# Patient Record
Sex: FEMALE | Race: WHITE | NOT HISPANIC OR LATINO | Employment: OTHER | URBAN - METROPOLITAN AREA
[De-identification: names, ages, dates, MRNs, and addresses within clinical notes are randomized per-mention and may not be internally consistent; named-entity substitution may affect disease eponyms.]

---

## 2020-01-30 ENCOUNTER — OFFICE VISIT (OUTPATIENT)
Dept: URGENT CARE | Facility: CLINIC | Age: 70
End: 2020-01-30
Payer: COMMERCIAL

## 2020-01-30 VITALS
RESPIRATION RATE: 16 BRPM | WEIGHT: 180.4 LBS | OXYGEN SATURATION: 97 % | TEMPERATURE: 99.2 F | DIASTOLIC BLOOD PRESSURE: 84 MMHG | HEART RATE: 91 BPM | BODY MASS INDEX: 35.42 KG/M2 | HEIGHT: 60 IN | SYSTOLIC BLOOD PRESSURE: 126 MMHG

## 2020-01-30 DIAGNOSIS — B96.89 ACUTE BACTERIAL BRONCHITIS: Primary | ICD-10-CM

## 2020-01-30 DIAGNOSIS — H10.10 ALLERGIC RHINOCONJUNCTIVITIS: ICD-10-CM

## 2020-01-30 DIAGNOSIS — J20.8 ACUTE BACTERIAL BRONCHITIS: Primary | ICD-10-CM

## 2020-01-30 DIAGNOSIS — J30.9 ALLERGIC RHINOCONJUNCTIVITIS: ICD-10-CM

## 2020-01-30 PROCEDURE — 99202 OFFICE O/P NEW SF 15 MIN: CPT | Performed by: PHYSICIAN ASSISTANT

## 2020-01-30 RX ORDER — BUPROPION HYDROCHLORIDE 150 MG/1
150 TABLET ORAL DAILY
COMMUNITY

## 2020-01-30 RX ORDER — ALBUTEROL SULFATE 90 UG/1
2 AEROSOL, METERED RESPIRATORY (INHALATION) EVERY 4 HOURS PRN
Qty: 8.5 G | Refills: 0 | Status: SHIPPED | OUTPATIENT
Start: 2020-01-30

## 2020-01-30 RX ORDER — AMLODIPINE BESYLATE 10 MG/1
10 TABLET ORAL DAILY
COMMUNITY

## 2020-01-30 RX ORDER — AZITHROMYCIN 250 MG/1
TABLET, FILM COATED ORAL
Qty: 6 TABLET | Refills: 0 | Status: SHIPPED | OUTPATIENT
Start: 2020-01-30 | End: 2020-02-03

## 2020-01-30 RX ORDER — LORAZEPAM 0.5 MG/1
TABLET ORAL EVERY 8 HOURS PRN
COMMUNITY

## 2020-01-30 RX ORDER — SIMVASTATIN 40 MG
40 TABLET ORAL
COMMUNITY

## 2020-01-30 RX ORDER — METHYLPREDNISOLONE 4 MG/1
TABLET ORAL
Qty: 1 EACH | Refills: 0 | Status: SHIPPED | OUTPATIENT
Start: 2020-01-30

## 2020-01-30 NOTE — PATIENT INSTRUCTIONS
Take the antibiotic as directed with food and water  Take a probiotic while taking this medication  Take the steroid as directed with food and water  While on this medication do not take any NSAIDs including ibuprofen (Advil), naproxen (Aleve), etc  Avoid caffeinated beverages while taking this medication  Use the inhaler as directed  Ensure that you prime the inhaler prior to first use and rinse your mouth after each use  Saltwater gargles, warm tea with honey, throat lozenges, and steam showers may help to reduce coughing fits  Use a cool mist humidifier at bedtime, turning on hours prior to bed with your bedroom doors shut for maximum relief  Follow up with your family doctor in 3-5 days if symptoms persist   Proceed to the ER if symptoms worsen  How to Use a Metered-Dose Inhaler   WHAT YOU NEED TO KNOW:   A metered-dose inhaler is a handheld device that gives you a dose of medicine as a mist  You breathe the medicine deep into your lungs to open your airways  The medicine either gives quick relief or long term control of symptoms  Bronchodilators open your airways  Mucolytics thin secretions and make them easier to cough up  Steroids decrease inflammation in your airways  DISCHARGE INSTRUCTIONS:   Return to the emergency department if:   · Your lips or nails turn blue or gray  · The skin between your ribs or around your neck pulls in with every breath  · You feel short of breath, even after you use your inhaler  Contact your healthcare provider if:   · You feel the medicine spray on your tongue or throat, rather than going into your lungs  · You have to take more puffs from the inhaler than directed, in order to get relief  · You run out of medicine before your next refill is due  · You feel like your medicine is not making your symptoms better  · You have questions or concerns about your condition or care  How to use a metered-dose inhaler:  Practice using your inhaler   Your medicine will work best if you use them correctly  The following steps will help you use your inhaler correctly:  · Prepare your inhaler:     ¨ Remove the cap  Check to make sure there is nothing in the mouthpiece that could block the medicine from coming out  ¨ Shake the inhaler to mix the medicine  ¨ Hold the inhaler upright, with the mouthpiece pointing towards your mouth  · Get ready to breathe in the medicine:      ¨ Keep your mouth away from the inhaler mouthpiece, and breathe out fully to clear your lungs  ¨ Place the mouthpiece between your lips  Close your lips around the mouthpiece to form a seal and prevent a medicine leak  · Start to breathe in slowly through your mouth  as  you press down the canister  This helps the medicine get into your lungs  · Hold your breath for at least 5 seconds  This will help the medicine reach all parts of your lungs, including the smaller parts called the alveoli  · Breathe out slowly through pursed lips  This helps keep your airway open and allows the medicine to be absorbed into more areas  · Repeat puffs of medicine as directed by your healthcare provider  Wait about 2 minutes between puffs  If you need to use a bronchodilator and a steroid inhaler, use the bronchodilator first  Wait 5 minutes then use the steroid inhaler  · Gargle with warm water to remove any leftover medicine from your mouth and throat  Care for your inhaler properly:  Put the cap back on the inhaler after each use to keep the mouthpiece clean  Clean the inhaler at least once a week  Remove the canister and wash the fernández with warm soapy water  Rinse and allow to air dry  Make sure the fernández is completely dry before putting the canister back in  Follow up with your healthcare provider or specialist as directed:  Bring your inhaler to all of your visits   You may be asked to use your inhaler at these visits so your healthcare provider can make sure you are using it correctly  Write down your questions, so you remember to ask them during your visits  © 2017 [employer] Information is for End User's use only and may not be sold, redistributed or otherwise used for commercial purposes  All illustrations and images included in CareNotes® are the copyrighted property of [employer]  or [employer] Willson  The above information is an  only  It is not intended as medical advice for individual conditions or treatments  Talk to your doctor, nurse or pharmacist before following any medical regimen to see if it is safe and effective for you

## 2020-01-30 NOTE — PROGRESS NOTES
[facility]        NAME: Gerline Severs is a 69 y o  female  : 1950    MRN: [id number]  DATE: 2020  TIME: 2:16 PM    Assessment and Plan   Acute bacterial bronchitis [J20 8, B96 89]  1  Acute bacterial bronchitis  azithromycin (ZITHROMAX) 250 mg tablet    methylPREDNISolone 4 MG tablet therapy pack    albuterol (PROAIR HFA) 90 mcg/act inhaler   2  Allergic rhinoconjunctivitis       Patient Instructions   Take the antibiotic as directed with food and water  Take a probiotic while taking this medication  Take the steroid as directed with food and water  While on this medication do not take any NSAIDs including ibuprofen (Advil), naproxen (Aleve), etc  Avoid caffeinated beverages while taking this medication  Use the inhaler as directed  Ensure that you prime the inhaler prior to first use and rinse your mouth after each use  Saltwater gargles, warm tea with honey, throat lozenges, and steam showers may help to reduce coughing fits  Use a cool mist humidifier at bedtime, turning on hours prior to bed with your bedroom doors shut for maximum relief  Follow up with your family doctor in 3-5 days if symptoms persist   Proceed to the ER if symptoms worsen  The diagnosis, expected course of illness, and treatment plan were reviewed  Advised Zyrtec and allergy relieving eyedrops for allergy symptoms  All questions answered  Precautions given  Patient verbalized understanding and agreement with the treatment plan  Chief Complaint     Chief Complaint   Patient presents with   Gardenia Miller Like Symptoms     has been sick since  getting better then sick the next day  Cough and body aches, tired     History of Present Illness     68 y/o female with c/o cough x 1 month  Notes she and her  have been passing this cough back and forth over this time reporting good days and bad days    States she thought she was on the mend, however over the past few weeks has been feverish, with chills, sweats, and fatigue  Over the past few days cough has become more harsh with intermittent wheezing, and GIBBONS  Notes she will feel tight or chest congestion that reduces but does not fully relieve with coughing  Wheezing is primarily heard at nighttime but is occasionally heard throughout the day  It typically relieves with coughing or on its own with rest and time  Denies chest pain, palpitations, or resting SOB  No abdominal pains, nausea, vomiting, or diarrhea  States she may have had some congestion at onset 1 month ago, however this has resolved  Reports her  is sick with similar symptoms, though more mild  Denies recent travel  Is new to the area and notes that she has had bags under her eyes, and itchy eyes for the past month or 2  Is wondering if she should take an allergy pill for this  Reports a history of allergies years ago but none recently  No recent travel  No sick contacts  No contacts who have recently traveled  Nonsmoker  No passive smoke  No flu shot  Believes she had pneumonia shot  Review of Systems   Review of Systems   Constitutional: Positive for chills, diaphoresis, fatigue and fever  HENT: Negative for congestion, ear pain, rhinorrhea and sore throat  Eyes: Positive for redness and itching  Negative for visual disturbance  Respiratory: Positive for cough, shortness of breath and wheezing  Cardiovascular: Negative for chest pain and palpitations  Gastrointestinal: Negative for abdominal pain, diarrhea, nausea and vomiting  Musculoskeletal: Positive for myalgias  Skin: Negative for rash  Neurological: Negative for dizziness and headaches       Current Medications       Current Outpatient Medications:     amLODIPine (NORVASC) 10 mg tablet, Take 10 mg by mouth daily, Disp: , Rfl:     buPROPion (WELLBUTRIN XL) 150 mg 24 hr tablet, Take 150 mg by mouth daily, Disp: , Rfl:     esomeprazole (NexIUM) 20 mg capsule, Take 20 mg by mouth every morning before breakfast, Disp: , Rfl:     LORazepam (ATIVAN) 0 5 mg tablet, Take by mouth every 8 (eight) hours as needed for anxiety, Disp: , Rfl:     simvastatin (ZOCOR) 40 mg tablet, Take 40 mg by mouth daily at bedtime, Disp: , Rfl:     albuterol (PROAIR HFA) 90 mcg/act inhaler, Inhale 2 puffs every 4 (four) hours as needed for wheezing or shortness of breath, Disp: 8 5 g, Rfl: 0    azithromycin (ZITHROMAX) 250 mg tablet, Take two tablets on day one, then one tablet daily  , Disp: 6 tablet, Rfl: 0    methylPREDNISolone 4 MG tablet therapy pack, Use as directed on package, Disp: 1 each, Rfl: 0    Current Allergies     Allergies as of 01/30/2020    (No Known Allergies)            The following portions of the patient's history were reviewed and updated as appropriate: allergies, current medications, past family history, past medical history, past social history, past surgical history and problem list      Past Medical History:   Diagnosis Date    Depression     GERD (gastroesophageal reflux disease)     Hypercholesterolemia     Hypertension        History reviewed  No pertinent surgical history  History reviewed  No pertinent family history  Medications have been verified  Objective   /84   Pulse 91   Temp 99 2 °F (37 3 °C)   Resp 16   Ht 4' 11 5" (1 511 m)   Wt 81 8 kg (180 lb 6 4 oz)   SpO2 97%   BMI 35 83 kg/m²      Physical Exam     Physical Exam   Constitutional: Vital signs are normal  She appears well-developed and well-nourished  She is cooperative  She appears ill  No distress  HENT:   Head: Normocephalic and atraumatic  Right Ear: Hearing, tympanic membrane, external ear and ear canal normal    Left Ear: Hearing, tympanic membrane, external ear and ear canal normal    Nose: Nose normal    Mouth/Throat: Uvula is midline, oropharynx is clear and moist and mucous membranes are normal  Mucous membranes are not pale, not dry and not cyanotic  No oral lesions   No uvula swelling  No oropharyngeal exudate, posterior oropharyngeal edema, posterior oropharyngeal erythema or tonsillar abscesses  Eyes: Conjunctivae and lids are normal  Right eye exhibits no discharge and no exudate  Left eye exhibits no discharge and no exudate  Right conjunctiva is not injected  Left conjunctiva is not injected  Neck: Trachea normal and phonation normal  Neck supple  No tracheal tenderness present  No neck rigidity  No edema and no erythema present  Cardiovascular: Normal rate, regular rhythm and normal heart sounds  Exam reveals no distant heart sounds  Pulmonary/Chest: Effort normal  No stridor  No respiratory distress  She has decreased breath sounds  She has no wheezes  She has no rhonchi  She has no rales  Lymphadenopathy:     She has no cervical adenopathy  Neurological: She is alert  She is not disoriented  No cranial nerve deficit  Coordination and gait normal    Skin: Skin is warm, dry and intact  No rash noted  She is not diaphoretic  No erythema  No pallor  Psychiatric: She has a normal mood and affect  Her behavior is normal  Judgment and thought content normal    Nursing note and vitals reviewed

## 2020-12-04 DIAGNOSIS — Z20.822 EXPOSURE TO COVID-19 VIRUS: Primary | ICD-10-CM

## 2022-06-11 ENCOUNTER — OFFICE VISIT (OUTPATIENT)
Dept: URGENT CARE | Facility: CLINIC | Age: 72
End: 2022-06-11
Payer: COMMERCIAL

## 2022-06-11 VITALS
HEART RATE: 87 BPM | OXYGEN SATURATION: 98 % | DIASTOLIC BLOOD PRESSURE: 84 MMHG | SYSTOLIC BLOOD PRESSURE: 152 MMHG | HEIGHT: 61 IN | BODY MASS INDEX: 33.23 KG/M2 | RESPIRATION RATE: 20 BRPM | WEIGHT: 176 LBS | TEMPERATURE: 96.9 F

## 2022-06-11 DIAGNOSIS — R42 DIZZINESS AND GIDDINESS: Primary | ICD-10-CM

## 2022-06-11 DIAGNOSIS — H61.23 BILATERAL IMPACTED CERUMEN: ICD-10-CM

## 2022-06-11 PROCEDURE — 99213 OFFICE O/P EST LOW 20 MIN: CPT | Performed by: PHYSICIAN ASSISTANT

## 2022-06-11 PROCEDURE — 69209 REMOVE IMPACTED EAR WAX UNI: CPT | Performed by: PHYSICIAN ASSISTANT

## 2022-06-11 NOTE — PATIENT INSTRUCTIONS
Cerumen removed from both ears with improvement in hearing and sensation of dizziness  Use over-the-counter Debrox ear drops for future prevention of impaction  Recommend continued use of Dramamine as needed for episodes of dizziness  Encouraged follow-up with PCP in next few days  To return or be seen in ER with any progressing or worsening symptoms

## 2022-06-11 NOTE — PROGRESS NOTES
[facility]        NAME: Freda Kim is a 67 y o  female  : 1950    MRN: [id number]  DATE: 2022  TIME: 12:51 PM    Assessment and Plan   Dizziness and giddiness [R42]  1  Dizziness and giddiness     2  Bilateral impacted cerumen           Patient Instructions     Patient Instructions   Cerumen removed from both ears with improvement in hearing and sensation of dizziness  Use over-the-counter Debrox ear drops for future prevention of impaction  Recommend continued use of Dramamine as needed for episodes of dizziness  Encouraged follow-up with PCP in next few days  To return or be seen in ER with any progressing or worsening symptoms  Follow up with PCP in 3-5 days  Proceed to  ER if symptoms worsen  Chief Complaint     Chief Complaint   Patient presents with    Dizziness     Pt presents with dizziness x 3 days with nausea  History of Present Illness       Patient is a 28-year-old female presenting today with dizziness x3 days  Patient notes 3 days ago after attempting to get out of bed she was hit with sudden onset of dizziness, notes that following this she had an episode of emesis and had to lie around at home for most of the day due to her dizziness, describes the episode much like vertigo, states that yesterday morning and this morning she has also experienced slight episodes of dizziness upon awakening, states that she is feeling much better today than she did 3 days ago but still experiences some feeling of uneasiness while walking  Reports taking Dramamine yesterday which did make her feel much better  Notes that her ears have also been bothering her and felt clogged for the last couple weeks  Denies syncope, chest pain, palpitations, lightheadedness, abdominal pain  Review of Systems   Review of Systems   Constitutional: Negative for chills and fever  HENT: Negative for ear pain and sore throat           See HPI   Eyes: Negative for pain and visual disturbance  Respiratory: Negative for cough and shortness of breath  Cardiovascular: Negative for chest pain and palpitations  Gastrointestinal: Positive for nausea and vomiting  Negative for abdominal pain  Genitourinary: Negative for dysuria and hematuria  Musculoskeletal: Negative for arthralgias and back pain  Skin: Negative for color change and rash  Neurological: Positive for dizziness  Negative for seizures and syncope  All other systems reviewed and are negative  Current Medications       Current Outpatient Medications:     albuterol (PROAIR HFA) 90 mcg/act inhaler, Inhale 2 puffs every 4 (four) hours as needed for wheezing or shortness of breath, Disp: 8 5 g, Rfl: 0    amLODIPine (NORVASC) 10 mg tablet, Take 10 mg by mouth daily, Disp: , Rfl:     buPROPion (WELLBUTRIN XL) 150 mg 24 hr tablet, Take 150 mg by mouth daily, Disp: , Rfl:     esomeprazole (NexIUM) 20 mg capsule, Take 20 mg by mouth every morning before breakfast, Disp: , Rfl:     LORazepam (ATIVAN) 0 5 mg tablet, Take by mouth every 8 (eight) hours as needed for anxiety, Disp: , Rfl:     methylPREDNISolone 4 MG tablet therapy pack, Use as directed on package, Disp: 1 each, Rfl: 0    simvastatin (ZOCOR) 40 mg tablet, Take 40 mg by mouth daily at bedtime, Disp: , Rfl:     Current Allergies     Allergies as of 06/11/2022    (No Known Allergies)            The following portions of the patient's history were reviewed and updated as appropriate: allergies, current medications, past family history, past medical history, past social history, past surgical history and problem list      Past Medical History:   Diagnosis Date    Depression     GERD (gastroesophageal reflux disease)     Hypercholesterolemia     Hypertension        History reviewed  No pertinent surgical history  History reviewed  No pertinent family history  Medications have been verified          Objective   /84   Pulse 87   Temp Joseline Umanzor ) 96 9 °F (36 1 °C)   Resp 20   Ht 5' 1" (1 549 m)   Wt 79 8 kg (176 lb)   SpO2 98%   BMI 33 25 kg/m²        Physical Exam     Physical Exam  Vitals reviewed  Constitutional:       General: She is not in acute distress  Appearance: Normal appearance  She is not toxic-appearing  Comments: Patient appears well and in good spirits   HENT:      Head: Normocephalic and atraumatic  Right Ear: External ear normal  There is impacted cerumen  Left Ear: External ear normal  There is impacted cerumen  Nose: Nose normal       Mouth/Throat:      Mouth: Mucous membranes are moist       Pharynx: Oropharynx is clear  Eyes:      Extraocular Movements: Extraocular movements intact  Conjunctiva/sclera: Conjunctivae normal       Pupils: Pupils are equal, round, and reactive to light  Cardiovascular:      Rate and Rhythm: Normal rate and regular rhythm  Pulses: Normal pulses  Heart sounds: Normal heart sounds  Pulmonary:      Effort: Pulmonary effort is normal       Breath sounds: Normal breath sounds  Skin:     General: Skin is warm  Capillary Refill: Capillary refill takes less than 2 seconds  Neurological:      General: No focal deficit present  Mental Status: She is alert and oriented to person, place, and time  Cranial Nerves: No cranial nerve deficit (Cranial nerves 2-12 intact)  Coordination: Coordination normal       Gait: Gait normal       Comments: Negative Romberg, GCS 15   Psychiatric:         Mood and Affect: Mood normal          Behavior: Behavior normal        Ear cerumen removal    Date/Time: 6/11/2022 12:55 PM  Performed by: Elissa Mendoza PA-C  Authorized by: Elissa Mendoza PA-C   Universal Protocol:  Consent: Verbal consent obtained    Risks and benefits: risks, benefits and alternatives were discussed  Consent given by: patient  Patient understanding: patient states understanding of the procedure being performed  Patient identity confirmed: verbally with patient      Procedure details:     Location:  L ear and R ear    Procedure type: irrigation only    Post-procedure details:     Complication:  None    Hearing quality:  Improved    Patient tolerance of procedure:   Tolerated well, no immediate complications  Comments:      Canals and TMs normal bilaterally after removal of cerumen